# Patient Record
Sex: MALE | Race: OTHER | HISPANIC OR LATINO | URBAN - METROPOLITAN AREA
[De-identification: names, ages, dates, MRNs, and addresses within clinical notes are randomized per-mention and may not be internally consistent; named-entity substitution may affect disease eponyms.]

---

## 2023-11-08 ENCOUNTER — APPOINTMENT (RX ONLY)
Dept: URBAN - METROPOLITAN AREA CLINIC 146 | Facility: CLINIC | Age: 28
Setting detail: DERMATOLOGY
End: 2023-11-08

## 2023-11-08 DIAGNOSIS — L28.0 LICHEN SIMPLEX CHRONICUS: ICD-10-CM

## 2023-11-08 PROCEDURE — ? PRESCRIPTION

## 2023-11-08 PROCEDURE — 99213 OFFICE O/P EST LOW 20 MIN: CPT

## 2023-11-08 PROCEDURE — ? COUNSELING

## 2023-11-08 PROCEDURE — ? ADDITIONAL NOTES

## 2023-11-08 RX ORDER — BETAMETHASONE DIPROPIONATE 0.5 MG/G
OINTMENT, AUGMENTED TOPICAL TWICE DAILY
Qty: 50 | Refills: 4 | COMMUNITY
Start: 2023-11-08

## 2023-11-08 RX ADMIN — BETAMETHASONE DIPROPIONATE: 0.5 OINTMENT, AUGMENTED TOPICAL at 00:00

## 2023-11-08 ASSESSMENT — LOCATION SIMPLE DESCRIPTION DERM
LOCATION SIMPLE: NECK
LOCATION SIMPLE: RIGHT HAND
LOCATION SIMPLE: RIGHT INDEX FINGER
LOCATION SIMPLE: LEFT HAND

## 2023-11-08 ASSESSMENT — LOCATION ZONE DERM
LOCATION ZONE: NECK
LOCATION ZONE: HAND
LOCATION ZONE: FINGER

## 2023-11-08 ASSESSMENT — LOCATION DETAILED DESCRIPTION DERM
LOCATION DETAILED: RIGHT DORSAL INDEX METACARPOPHALANGEAL JOINT
LOCATION DETAILED: RIGHT PROXIMAL DORSAL INDEX FINGER
LOCATION DETAILED: LEFT RADIAL DORSAL HAND
LOCATION DETAILED: LEFT CENTRAL POSTERIOR NECK

## 2023-11-08 NOTE — PROCEDURE: ADDITIONAL NOTES
Detail Level: Simple
Additional Notes: Kenalog 10mg [place] injection courtesy.  1cc exp:Sept 2024 lot: [id number] NDC: [id number]
Render Risk Assessment In Note?: yes